# Patient Record
Sex: FEMALE | Employment: FULL TIME | URBAN - METROPOLITAN AREA
[De-identification: names, ages, dates, MRNs, and addresses within clinical notes are randomized per-mention and may not be internally consistent; named-entity substitution may affect disease eponyms.]

---

## 2022-11-17 ENCOUNTER — OFFICE VISIT (OUTPATIENT)
Dept: ORTHOPEDIC SURGERY | Age: 42
End: 2022-11-17
Payer: COMMERCIAL

## 2022-11-17 VITALS — HEIGHT: 69 IN | BODY MASS INDEX: 43.4 KG/M2 | WEIGHT: 293 LBS

## 2022-11-17 DIAGNOSIS — G56.21 CUBITAL TUNNEL SYNDROME ON RIGHT: Primary | ICD-10-CM

## 2022-11-17 PROCEDURE — 99203 OFFICE O/P NEW LOW 30 MIN: CPT | Performed by: ORTHOPAEDIC SURGERY

## 2022-11-17 RX ORDER — DICLOFENAC SODIUM 75 MG/1
TABLET, DELAYED RELEASE ORAL
COMMUNITY
Start: 2022-10-25 | End: 2022-11-17 | Stop reason: ALTCHOICE

## 2022-11-17 RX ORDER — MELOXICAM 15 MG/1
15 TABLET ORAL DAILY
Qty: 30 TABLET | Refills: 0 | Status: SHIPPED | OUTPATIENT
Start: 2022-11-17 | End: 2022-12-17

## 2022-11-17 NOTE — PATIENT INSTRUCTIONS
From the [id number] [facility]    Cubital Tunnel Syndrome    Cubital Tunnel Syndrome is a condition that involves pressure or stretching of the ulnar nerve (also known as the funny bone nerve), which can cause numbness or tingling in the ring and small fingers, pain in the forearm, and/or weakness in the hand. The ulnar nerve (Figure 1) runs in a groove on the inner side of the elbow. Figure 1  Ulnar nerve at elbow joint (inner side of elbow), which is involved in cubital tunnel syndrome        Causes    There are a few causes of this ulnar nerve problem. These include:    Pressure: The nerve has little padding over it. Direct pressure (like leaning the arm on an arm rest) can press the nerve, causing the arm and hand -- especially the ring and small fingers -- to fall asleep.     Stretching: Keeping the elbow bent for a long time can stretch the nerve behind the elbow. This can happen during sleep. Anatomy: Sometimes, the ulnar nerve does not stay in its place and snaps back and forth over a bony bump as the elbow is moved. Repeated snapping can irritate the nerve. Sometimes, the soft tissues over the nerve become thicker or there is an extra muscle over the nerve that can keep it from working correctly. Signs and Symptoms    Cubital tunnel syndrome can cause pain, loss of sensation, tingling and/or weakness. Pins and needles usually are felt in the ring and small fingers. These symptoms are often felt when the elbow is bent for a long period of time, such as while holding a phone or while sleeping. Some people feel weak or clumsy. Diagnosis    Your doctor can learn much by asking you about your symptoms and examining you. S/he might test you for other medical problems like diabetes or thyroid disease. Sometimes, nerve testing (EMG/NCS) may be needed to see how much the nerve and muscle are being affected.  This test also checks for other problems such as a pinched nerve in the neck, which can cause similar symptoms. Treatment    The first treatment is to avoid actions that cause symptoms. Wrapping a pillow or towel loosely around the elbow or wearing a splint at night to keep the elbow from bending can help. Avoiding leaning on the funny bone can also help. A hand therapist can help you find ways to avoid pressure on the nerve. Sometimes, surgery may be needed to relieve the pressure on the nerve. This can involve releasing the nerve, moving the nerve to the front of the elbow, and/or removing a part of the bone. Your surgeon will talk to you about options. Therapy is sometimes needed after surgery, and the time it takes to recover can vary. Numbness and tingling may improve quickly or slowly. It may take many months for recovery after surgery. Cubital tunnel symptoms may not totally go away after surgery, especially if symptoms are severe.

## 2022-11-17 NOTE — PROGRESS NOTES
Matias Dave (: 1980) is a 36 y.o. female patient here for evaluation of the following chief complaint(s):  Hand Pain (Right hand), Wrist Pain (Right wrist), and Elbow Pain (Right elbow)       ASSESSMENT/PLAN:  Below is the assessment and plan developed based on review of pertinent history, physical exam, labs, studies, and medications. 1. Cubital tunnel syndrome on right      79-year-old female with right cubital tunnel syndrome we discussed night bracing, meloxicam and she will call for follow-up if needed also can just call to get an EMG scheduled. Patient verbalized understanding and elected to proceed. All questions were answered to the patient's apparent satisfaction. SUBJECTIVE/OBJECTIVE:  HPI    79-year-old female with right elbow pain mostly medial and numbness and tingling going into the hand. She fell 2 months ago and symptoms started about 2 weeks after but she does not recall hitting her elbow. She states it wakes her up at night at times. Patient reports a gradual onset of symptoms. Duration of problem 6 weeks. Symptom Severity 8/10  Symptom Frequency constant        No Known Allergies    Current Outpatient Medications   Medication Sig    diclofenac EC (VOLTAREN) 75 mg EC tablet TAKE 1 TABLET BY MOUTH TWICE A DAY AS NEEDED FOR 30 DAYS     No current facility-administered medications for this visit.        Social History     Socioeconomic History    Marital status: SINGLE     Spouse name: Not on file    Number of children: Not on file    Years of education: Not on file    Highest education level: Not on file   Occupational History    Not on file   Tobacco Use    Smoking status: Never     Passive exposure: Never    Smokeless tobacco: Never   Vaping Use    Vaping Use: Never used   Substance and Sexual Activity    Alcohol use: Yes    Drug use: Never    Sexual activity: Not on file   Other Topics Concern    Not on file   Social History Narrative    Not on file     Social Determinants of Health     Financial Resource Strain: Not on file   Food Insecurity: Not on file   Transportation Needs: Not on file   Physical Activity: Not on file   Stress: Not on file   Social Connections: Not on file   Intimate Partner Violence: Not on file   Housing Stability: Not on file       History reviewed. No pertinent surgical history. History reviewed. No pertinent family history. Review of Systems    No flowsheet data found. Vitals:  Ht 5' 8.5\" (1.74 m)   Wt 305 lb (138.3 kg)   BMI 45.70 kg/m²    Estimated body surface area is 2.59 meters squared as calculated from the following:    Height as of this encounter: 5' 8.5\" (1.74 m). Weight as of this encounter: 305 lb (138.3 kg). Body mass index is 45.7 kg/m². Physical Exam    Musculoskeletal Exam:    Right NEURO EXAM:    Phalen's: Negative    MNCT: Negative    Thenar Atrophy: none    Tinel's MN: Negative    APB STRENGTH: 5/5    1st DI Strength: 4/5      Elbow Flexion Test: Positive    UNCT Elbow: Positive    UNCT Palm: Negative    Ring/Small Clawing: Negative    Froment Sign: Negative    Tinel's UN: Positive    Patient fires AIN, PIN and ulnar nerves. Sensation is grossly intact in the median, radial and ulnar distribution. Hand is pink and appears well-perfused. Hand is warm. Skin is intact. Compartments are soft and compressible. Consitutional: Healthy  Skin:   - Edema - none  - Cellulitis - No    Neuro: Numbness or tingling in R/L arm: Yes    Psych: Affect normal    Cardiovascular: Capillary Refill < 2 seconds in upper extremities    Respiratory: Non-Labored Breathing    ROS:    Constitutional: Denies fever/chills    Respiratory: Denies SOB          An electronic signature was used to authenticate this note.   -- Jenny Parker MD

## 2022-12-16 DIAGNOSIS — G56.21 CUBITAL TUNNEL SYNDROME ON RIGHT: ICD-10-CM

## 2022-12-19 RX ORDER — MELOXICAM 15 MG/1
15 TABLET ORAL DAILY
Qty: 30 TABLET | Refills: 0 | Status: SHIPPED | OUTPATIENT
Start: 2022-12-19 | End: 2023-01-18